# Patient Record
Sex: FEMALE | Race: WHITE | Employment: STUDENT | URBAN - METROPOLITAN AREA
[De-identification: names, ages, dates, MRNs, and addresses within clinical notes are randomized per-mention and may not be internally consistent; named-entity substitution may affect disease eponyms.]

---
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
## 2021-05-27 ENCOUNTER — APPOINTMENT (OUTPATIENT)
Dept: GENERAL RADIOLOGY | Age: 21
End: 2021-05-27
Attending: NURSE PRACTITIONER
Payer: COMMERCIAL

## 2021-05-27 ENCOUNTER — HOSPITAL ENCOUNTER (OUTPATIENT)
Age: 21
Discharge: HOME | End: 2021-05-27
Payer: COMMERCIAL

## 2021-05-27 ENCOUNTER — TELEPHONE (OUTPATIENT)
Dept: INTERNAL MEDICINE CLINIC | Facility: CLINIC | Age: 21
End: 2021-05-27

## 2021-05-27 VITALS
SYSTOLIC BLOOD PRESSURE: 132 MMHG | RESPIRATION RATE: 18 BRPM | TEMPERATURE: 98 F | OXYGEN SATURATION: 100 % | DIASTOLIC BLOOD PRESSURE: 79 MMHG | HEART RATE: 71 BPM

## 2021-05-27 DIAGNOSIS — Z00.00 EVALUATION BY MEDICAL SERVICE REQUIRED: Primary | ICD-10-CM

## 2021-05-27 DIAGNOSIS — Z20.822 LAB TEST NEGATIVE FOR COVID-19 VIRUS: ICD-10-CM

## 2021-05-27 PROCEDURE — 85378 FIBRIN DEGRADE SEMIQUANT: CPT | Performed by: NURSE PRACTITIONER

## 2021-05-27 PROCEDURE — 81002 URINALYSIS NONAUTO W/O SCOPE: CPT

## 2021-05-27 PROCEDURE — 99215 OFFICE O/P EST HI 40 MIN: CPT

## 2021-05-27 PROCEDURE — 71046 X-RAY EXAM CHEST 2 VIEWS: CPT | Performed by: NURSE PRACTITIONER

## 2021-05-27 PROCEDURE — 93010 ELECTROCARDIOGRAM REPORT: CPT

## 2021-05-27 PROCEDURE — 80047 BASIC METABLC PNL IONIZED CA: CPT

## 2021-05-27 PROCEDURE — 85025 COMPLETE CBC W/AUTO DIFF WBC: CPT | Performed by: NURSE PRACTITIONER

## 2021-05-27 PROCEDURE — 99213 OFFICE O/P EST LOW 20 MIN: CPT

## 2021-05-27 PROCEDURE — 36415 COLL VENOUS BLD VENIPUNCTURE: CPT

## 2021-05-27 PROCEDURE — 87086 URINE CULTURE/COLONY COUNT: CPT | Performed by: NURSE PRACTITIONER

## 2021-05-27 PROCEDURE — 93010 ELECTROCARDIOGRAM REPORT: CPT | Performed by: NURSE PRACTITIONER

## 2021-05-27 PROCEDURE — 81025 URINE PREGNANCY TEST: CPT

## 2021-05-27 PROCEDURE — 93005 ELECTROCARDIOGRAM TRACING: CPT

## 2021-05-27 RX ORDER — DEXTROAMPHETAMINE SACCHARATE, AMPHETAMINE ASPARTATE, DEXTROAMPHETAMINE SULFATE AND AMPHETAMINE SULFATE 2.5; 2.5; 2.5; 2.5 MG/1; MG/1; MG/1; MG/1
10 TABLET ORAL DAILY
COMMUNITY
End: 2021-09-13

## 2021-05-27 NOTE — ED PROVIDER NOTES
Patient Seen in: [facility]      History   No chief complaint on file.     Stated Complaint: not feeling well    HPI/Subjective:   HPI    23-year-old female with a history of tachycardia, panic attacks, headache here today with complaints of \ 100 %   O2 Device None (Room air)       Current:/79   Pulse 71   Temp 98 °F (36.7 °C) (Temporal)   Resp 18   SpO2 100%         Physical Exam    Adult physical exam:     VS: Vital signs reviewed.  O2 saturation within normal limits for this patient patient evaluation without personal hand/facial/oropharyngeal contact and gloves worn throughout encounter. See note and/or contact this provider for further PPE details.   Medical Record Review/reassessment: I reviewed available prior medical records for a

## 2021-05-27 NOTE — TELEPHONE ENCOUNTER
No PHI in chart to speak with Leida Ernst (mother) who is concerned about pts' fingers and toes getting cold intermittently, then gets warm and flushing of face. [place] at the mall today. Requesting appt today or tomorrow with new physician.    Note:

## 2021-05-27 NOTE — ED INITIAL ASSESSMENT (HPI)
Patient reports intermittent episodes of not feeling well over the last 8-9 months. Reports episodes of feeling extremely chilled and then flushed.  + hands cool to the touch, also with toes feeling numb at times.   + generalized weakness, states her limbs

## 2021-05-27 NOTE — TELEPHONE ENCOUNTER
Patient calling  ( see note below ) reports has not seen a doctor for over one year .  Has been noticing for a months toes \" turn yellow \" and go numb, fingers and lips \" turn purple \" for a few hours   Often feels chilled when none else is cold ; often

## 2021-05-27 NOTE — TELEPHONE ENCOUNTER
I contacted pt at home (PVM#[phone]), pt stated \"I just arrived home and want to drink water first, then will call back. Will wait for pt call back.      Please reply to pool: JO RN TRIAGE  Note pt has appt 5/28/21 @ 9:30am with Dr Zackary Engle

## 2021-05-28 ENCOUNTER — OFFICE VISIT (OUTPATIENT)
Dept: FAMILY MEDICINE CLINIC | Facility: CLINIC | Age: 21
End: 2021-05-28
Payer: COMMERCIAL

## 2021-05-28 ENCOUNTER — LAB ENCOUNTER (OUTPATIENT)
Dept: LAB | Age: 21
End: 2021-05-28
Attending: STUDENT IN AN ORGANIZED HEALTH CARE EDUCATION/TRAINING PROGRAM
Payer: COMMERCIAL

## 2021-05-28 VITALS
HEIGHT: 70 IN | WEIGHT: 137 LBS | TEMPERATURE: 98 F | SYSTOLIC BLOOD PRESSURE: 114 MMHG | DIASTOLIC BLOOD PRESSURE: 78 MMHG | HEART RATE: 101 BPM | BODY MASS INDEX: 19.61 KG/M2

## 2021-05-28 DIAGNOSIS — J30.2 SEASONAL ALLERGIES: ICD-10-CM

## 2021-05-28 DIAGNOSIS — R20.2 PARESTHESIA: ICD-10-CM

## 2021-05-28 DIAGNOSIS — R20.2 PARESTHESIA: Primary | ICD-10-CM

## 2021-05-28 PROBLEM — S90.222A SUBUNGUAL HEMATOMA OF FOOT, LEFT, INITIAL ENCOUNTER: Status: ACTIVE | Noted: 2019-01-22

## 2021-05-28 PROCEDURE — 99203 OFFICE O/P NEW LOW 30 MIN: CPT | Performed by: STUDENT IN AN ORGANIZED HEALTH CARE EDUCATION/TRAINING PROGRAM

## 2021-05-28 PROCEDURE — 86431 RHEUMATOID FACTOR QUANT: CPT

## 2021-05-28 PROCEDURE — 83540 ASSAY OF IRON: CPT

## 2021-05-28 PROCEDURE — 82746 ASSAY OF FOLIC ACID SERUM: CPT

## 2021-05-28 PROCEDURE — 82306 VITAMIN D 25 HYDROXY: CPT

## 2021-05-28 PROCEDURE — 86038 ANTINUCLEAR ANTIBODIES: CPT

## 2021-05-28 PROCEDURE — 36415 COLL VENOUS BLD VENIPUNCTURE: CPT

## 2021-05-28 PROCEDURE — 84466 ASSAY OF TRANSFERRIN: CPT

## 2021-05-28 PROCEDURE — 82607 VITAMIN B-12: CPT

## 2021-05-28 PROCEDURE — 84439 ASSAY OF FREE THYROXINE: CPT

## 2021-05-28 PROCEDURE — 84443 ASSAY THYROID STIM HORMONE: CPT

## 2021-05-28 PROCEDURE — 84425 ASSAY OF VITAMIN B-1: CPT

## 2021-05-28 PROCEDURE — 3074F SYST BP LT 130 MM HG: CPT | Performed by: STUDENT IN AN ORGANIZED HEALTH CARE EDUCATION/TRAINING PROGRAM

## 2021-05-28 PROCEDURE — 3078F DIAST BP <80 MM HG: CPT | Performed by: STUDENT IN AN ORGANIZED HEALTH CARE EDUCATION/TRAINING PROGRAM

## 2021-05-28 PROCEDURE — 3008F BODY MASS INDEX DOCD: CPT | Performed by: STUDENT IN AN ORGANIZED HEALTH CARE EDUCATION/TRAINING PROGRAM

## 2021-05-28 NOTE — PROGRESS NOTES
HPI:    Patient ID: Mel Roberson is a 22year old female. HPI  Pt presenting with multiple concerns. She reports irregular daily life for the last 8 months due to multiple physical symptoms.  She reports issues with body temperature dysregulation, ear normal.   Eyes:      Extraocular Movements: Extraocular movements intact. Conjunctiva/sclera: Conjunctivae normal.      Pupils: Pupils are equal, round, and reactive to light. Neck:      Thyroid: No thyroid mass or thyroid tenderness.    [employer] - demonstrated how to administer Flonase medication  - avoid triggers as able  - increase fluid hydration and rest as tolerated  - to call with any questions or concerns    Pt verbalized understanding and agrees with plan.     Spent 35 minutes obtaining HP

## 2021-08-11 ENCOUNTER — OFFICE VISIT (OUTPATIENT)
Dept: FAMILY MEDICINE CLINIC | Facility: CLINIC | Age: 21
End: 2021-08-11
Payer: COMMERCIAL

## 2021-08-11 VITALS
SYSTOLIC BLOOD PRESSURE: 116 MMHG | HEART RATE: 50 BPM | WEIGHT: 135 LBS | DIASTOLIC BLOOD PRESSURE: 51 MMHG | HEIGHT: 70 IN | TEMPERATURE: 98 F | BODY MASS INDEX: 19.33 KG/M2

## 2021-08-11 DIAGNOSIS — M54.2 PAIN OF NECK WITH RECENT TRAUMATIC INJURY: ICD-10-CM

## 2021-08-11 DIAGNOSIS — R42 DIZZINESS AFTER EXTENSION OF NECK: ICD-10-CM
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
## 2021-08-11 PROCEDURE — 3008F BODY MASS INDEX DOCD: CPT | Performed by: STUDENT IN AN ORGANIZED HEALTH CARE EDUCATION/TRAINING PROGRAM

## 2021-08-11 PROCEDURE — 3078F DIAST BP <80 MM HG: CPT | Performed by: STUDENT IN AN ORGANIZED HEALTH CARE EDUCATION/TRAINING PROGRAM

## 2021-08-11 PROCEDURE — 99214 OFFICE O/P EST MOD 30 MIN: CPT | Performed by: STUDENT IN AN ORGANIZED HEALTH CARE EDUCATION/TRAINING PROGRAM

## 2021-08-11 PROCEDURE — 3074F SYST BP LT 130 MM HG: CPT | Performed by: STUDENT IN AN ORGANIZED HEALTH CARE EDUCATION/TRAINING PROGRAM

## 2021-08-11 RX ORDER — NORETHINDRONE ACETATE AND ETHINYL ESTRADIOL, ETHINYL ESTRADIOL AND FERROUS FUMARATE 1MG-10(24)
1 KIT ORAL DAILY
COMMUNITY
Start: 2021-06-18 | End: 2021-09-14

## 2021-08-11 RX ORDER — PREDNISONE 20 MG/1
40 TABLET ORAL DAILY
Qty: 10 TABLET | Refills: 0 | Status: SHIPPED | OUTPATIENT
Start: 2021-08-11 | End: 2021-08-11

## 2021-08-11 RX ORDER — PREDNISONE 20 MG/1
40 TABLET ORAL DAILY
Qty: 10 TABLET | Refills: 0 | Status: SHIPPED | OUTPATIENT
Start: 2021-08-11 | End: 2021-08-16

## 2021-08-13 ENCOUNTER — TELEPHONE (OUTPATIENT)
Dept: FAMILY MEDICINE CLINIC | Facility: CLINIC | Age: 21
End: 2021-08-13
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
## 2021-09-10 ENCOUNTER — TELEPHONE (OUTPATIENT)
Dept: FAMILY MEDICINE CLINIC | Facility: CLINIC | Age: 21
End: 2021-09-10

## 2021-09-10 ENCOUNTER — MED REC SCAN ONLY (OUTPATIENT)
Dept: FAMILY MEDICINE CLINIC | Facility: CLINIC | Age: 21
End: 2021-09-10

## 2021-09-10 NOTE — TELEPHONE ENCOUNTER
Received fax from [facility]. Forms signed by dr Harpal Isbell. Successfully faxed back to [phone]. Confirmation # and forms placed in scanning.

## 2021-09-13 NOTE — TELEPHONE ENCOUNTER
According to office visit notes from her pediatrician Dr. Balbina Ventura in 07/11/19, pt has ADHD( originally diagnosed in 11/28/2012).

## 2021-09-13 NOTE — TELEPHONE ENCOUNTER
Pt would like to know if Dr. Carole Dumas can refill her medication of adderall 20 milligrams once a day. Per the patient the psychiatrist that she sees and prescribes the medication to her in retiring. Pt states that she lives out of [facility] for School.  Please

## 2021-09-13 NOTE — TELEPHONE ENCOUNTER
Dr. Wilmer Sampson-- historical medication, routing to you.  Medication has been pended for your review/approval.

## 2021-09-14 RX ORDER — NORETHINDRONE ACETATE AND ETHINYL ESTRADIOL, ETHINYL ESTRADIOL AND FERROUS FUMARATE 1MG-10(24)
KIT ORAL
Qty: 84 TABLET | Refills: 1 | Status: SHIPPED | OUTPATIENT
Start: 2021-09-14

## 2021-09-14 NOTE — TELEPHONE ENCOUNTER
Spoke w/ pt. She is away at college & will be back in Dec for the holidays. Pt will schedule appt then. rx sent.  Pt verbalized an understanding & agrees w/ plan

## 2021-10-15 DIAGNOSIS — F90.9 ATTENTION DEFICIT HYPERACTIVITY DISORDER (ADHD), UNSPECIFIED ADHD TYPE: ICD-10-CM

## 2021-10-15 RX ORDER — DEXTROAMPHETAMINE SACCHARATE, AMPHETAMINE ASPARTATE, DEXTROAMPHETAMINE SULFATE AND AMPHETAMINE SULFATE 5; 5; 5; 5 MG/1; MG/1; MG/1; MG/1
20 TABLET ORAL DAILY
Qty: 30 TABLET | Refills: 0 | Status: SHIPPED | OUTPATIENT
Start: 2021-10-15

## 2021-10-15 NOTE — TELEPHONE ENCOUNTER
Pt would like a refill on RX adderall 20MG for 30 tablets. Pt stts she has 1 pill left.  Please advise       Current Outpatient Medications   Medication Sig Dispense Refill          • amphetamine-dextroamphetamine 20 MG Oral Tab Take 1 tablet (20 mg total)

## 2021-11-15 DIAGNOSIS — F90.9 ATTENTION DEFICIT HYPERACTIVITY DISORDER (ADHD), UNSPECIFIED ADHD TYPE: ICD-10-CM

## 2021-11-16 NOTE — TELEPHONE ENCOUNTER
Patient is out of medication. Dr. Syd Sharma is out of the office today and patient is out of medication. Please advise.

## 2021-11-17 ENCOUNTER — TELEPHONE (OUTPATIENT)
Dept: FAMILY MEDICINE CLINIC | Facility: CLINIC | Age: 21
End: 2021-11-17

## 2021-11-17 RX ORDER — DEXTROAMPHETAMINE SACCHARATE, AMPHETAMINE ASPARTATE, DEXTROAMPHETAMINE SULFATE AND AMPHETAMINE SULFATE 5; 5; 5; 5 MG/1; MG/1; MG/1; MG/1
20 TABLET ORAL DAILY
Qty: 30 TABLET | Refills: 0 | Status: SHIPPED | OUTPATIENT
Start: 2022-01-16 | End: 2022-02-15

## 2021-11-17 RX ORDER — DEXTROAMPHETAMINE SACCHARATE, AMPHETAMINE ASPARTATE, DEXTROAMPHETAMINE SULFATE AND AMPHETAMINE SULFATE 5; 5; 5; 5 MG/1; MG/1; MG/1; MG/1
20 TABLET ORAL DAILY
Qty: 30 TABLET | Refills: 0 | Status: SHIPPED | OUTPATIENT
Start: 2021-11-17 | End: 2021-12-17

## 2021-11-17 RX ORDER — DEXTROAMPHETAMINE SACCHARATE, AMPHETAMINE ASPARTATE, DEXTROAMPHETAMINE SULFATE AND AMPHETAMINE SULFATE 5; 5; 5; 5 MG/1; MG/1; MG/1; MG/1
20 TABLET ORAL DAILY
Qty: 30 TABLET | Refills: 0 | Status: SHIPPED | OUTPATIENT
Start: 2021-12-16 | End: 2022-01-14

## 2021-11-17 NOTE — TELEPHONE ENCOUNTER
Patients mother Beau Arredondo calling for patient regarding script for rx Adderall  Please call patient directly at [phone],[employer] Scheduling.   *Requesting for script to be filled right away, patient is in [facility].

## 2021-11-17 NOTE — TELEPHONE ENCOUNTER
Verified name and . Patient was notified that prescription was sent to pharmacy. Disp Refills Start End    amphetamine-dextroamphetamine (ADDERALL) 20 MG Oral Tab 30 tablet 0 2021    Sig - Route:  Take 1 tablet (20 mg total) by mo

## 2022-01-17 ENCOUNTER — MED REC SCAN ONLY (OUTPATIENT)
Dept: FAMILY MEDICINE CLINIC | Facility: CLINIC | Age: 22
End: 2022-01-17

## 2022-01-17 ENCOUNTER — TELEPHONE (OUTPATIENT)
Dept: FAMILY MEDICINE CLINIC | Facility: CLINIC | Age: 22
End: 2022-01-17

## 2022-01-17 NOTE — TELEPHONE ENCOUNTER
Received fax fro, [facility]. Form signed by dr Kristal Darnell. Successfully faxed back to [phone]. Confirmation # and forms placed in scanning.

## 2022-02-15 ENCOUNTER — TELEPHONE (OUTPATIENT)
Dept: FAMILY MEDICINE CLINIC | Facility: CLINIC | Age: 22
End: 2022-02-15

## 2022-02-15 RX ORDER — DEXTROAMPHETAMINE SACCHARATE, AMPHETAMINE ASPARTATE, DEXTROAMPHETAMINE SULFATE AND AMPHETAMINE SULFATE 5; 5; 5; 5 MG/1; MG/1; MG/1; MG/1
20 TABLET ORAL DAILY
Qty: 30 TABLET | Refills: 0 | Status: CANCELLED | OUTPATIENT
Start: 2022-04-17 | End: 2022-05-17

## 2022-02-15 RX ORDER — DEXTROAMPHETAMINE SACCHARATE, AMPHETAMINE ASPARTATE, DEXTROAMPHETAMINE SULFATE AND AMPHETAMINE SULFATE 5; 5; 5; 5 MG/1; MG/1; MG/1; MG/1
20 TABLET ORAL DAILY
Qty: 30 TABLET | Refills: 0 | Status: CANCELLED | OUTPATIENT
Start: 2022-03-17 | End: 2022-04-16

## 2022-02-15 RX ORDER — DEXTROAMPHETAMINE SACCHARATE, AMPHETAMINE ASPARTATE, DEXTROAMPHETAMINE SULFATE AND AMPHETAMINE SULFATE 5; 5; 5; 5 MG/1; MG/1; MG/1; MG/1
20 TABLET ORAL DAILY
Qty: 30 TABLET | Refills: 0 | Status: CANCELLED | OUTPATIENT
Start: 2022-03-18 | End: 2022-04-17

## 2022-02-15 RX ORDER — DEXTROAMPHETAMINE SACCHARATE, AMPHETAMINE ASPARTATE, DEXTROAMPHETAMINE SULFATE AND AMPHETAMINE SULFATE 5; 5; 5; 5 MG/1; MG/1; MG/1; MG/1
20 TABLET ORAL DAILY
Qty: 30 TABLET | Refills: 0 | Status: SHIPPED | OUTPATIENT
Start: 2022-02-15 | End: 2022-03-17

## 2022-02-15 RX ORDER — DEXTROAMPHETAMINE SACCHARATE, AMPHETAMINE ASPARTATE, DEXTROAMPHETAMINE SULFATE AND AMPHETAMINE SULFATE 5; 5; 5; 5 MG/1; MG/1; MG/1; MG/1
20 TABLET ORAL DAILY
Qty: 30 TABLET | Refills: 0 | Status: CANCELLED | OUTPATIENT
Start: 2022-02-15 | End: 2022-03-17

## 2022-02-15 RX ORDER — DEXTROAMPHETAMINE SACCHARATE, AMPHETAMINE ASPARTATE, DEXTROAMPHETAMINE SULFATE AND AMPHETAMINE SULFATE 5; 5; 5; 5 MG/1; MG/1; MG/1; MG/1
20 TABLET ORAL DAILY
Qty: 30 TABLET | Refills: 0 | Status: CANCELLED | OUTPATIENT
Start: 2022-04-18 | End: 2022-05-18

## 2022-02-15 NOTE — TELEPHONE ENCOUNTER
Patient calling to check on the status of refill for Adderall. She states that she needs it by end of day.  Please sent to   [facility], [place]

## 2022-02-15 NOTE — TELEPHONE ENCOUNTER
Dr. Scarlett Newman did fill Adderall 2/15/2022 for patient, as Dr. Derek Adams out of office today--further refills as per Dr. Derek Adams.     March and April Adderall pended per patient request    Please send, if appropriate

## 2022-02-15 NOTE — TELEPHONE ENCOUNTER
Sent to Dr. Bret Mckinney for Dr. Wily Redman (out of office)--patient tearful--requesting refill today    Medications pended for approval    Pharmacy verified

## 2022-02-15 NOTE — TELEPHONE ENCOUNTER
Spoke with patient and relayed Dr. Xavi Pack message below--patient verbalizes understanding and agreement and is thankful, as she has a dance performance tomorrow. No further questions/concerns at this time.

## 2022-02-15 NOTE — TELEPHONE ENCOUNTER
Message noted: Chart reviewed and may refill medication as requested times one. Prescription sent to listed pharmacy.  Further refills as per Dr Jeff Polo

## 2022-02-15 NOTE — TELEPHONE ENCOUNTER
Pt is calling for status of her medication refill request. Pt states that she is out of medication. Pt would like confirm that it will be sent to [facility] in [place]. Pt can be reached at [phone].

## 2022-02-15 NOTE — TELEPHONE ENCOUNTER
Patient mother requesting refill on following prescription. Patient is out of medication. Patient thought it was going to be filled for a 90 day supply.  Please sent to [facility] in [place].       amphetamine-dextroamphetamine (ADDERALL) 20 MG Oral Tab

## 2022-02-16 RX ORDER — DEXTROAMPHETAMINE SACCHARATE, AMPHETAMINE ASPARTATE, DEXTROAMPHETAMINE SULFATE AND AMPHETAMINE SULFATE 5; 5; 5; 5 MG/1; MG/1; MG/1; MG/1
TABLET ORAL
Qty: 30 TABLET | Refills: 0 | Status: SHIPPED | OUTPATIENT
Start: 2022-03-15

## 2022-02-16 RX ORDER — DEXTROAMPHETAMINE SACCHARATE, AMPHETAMINE ASPARTATE, DEXTROAMPHETAMINE SULFATE AND AMPHETAMINE SULFATE 5; 5; 5; 5 MG/1; MG/1; MG/1; MG/1
20 TABLET ORAL DAILY
Qty: 30 TABLET | Refills: 0 | Status: SHIPPED | OUTPATIENT
Start: 2022-04-15 | End: 2022-05-15

## 2022-03-25 RX ORDER — NORETHINDRONE ACETATE AND ETHINYL ESTRADIOL, ETHINYL ESTRADIOL AND FERROUS FUMARATE 1MG-10(24)
1 KIT ORAL DAILY
Qty: 84 TABLET | Refills: 0 | Status: SHIPPED | OUTPATIENT
Start: 2022-03-25 | End: 2022-06-20

## 2022-04-06 ENCOUNTER — MED REC SCAN ONLY (OUTPATIENT)
Dept: FAMILY MEDICINE CLINIC | Facility: CLINIC | Age: 22
End: 2022-04-06

## 2022-04-12 NOTE — TELEPHONE ENCOUNTER
Patient is requesting a refill, send to [facility]- #100 on file/updated.     amphetamine-dextroamphetamine (ADDERALL) 20 MG Oral Tab
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
## 2022-04-13 RX ORDER — DEXTROAMPHETAMINE SACCHARATE, AMPHETAMINE ASPARTATE, DEXTROAMPHETAMINE SULFATE AND AMPHETAMINE SULFATE 5; 5; 5; 5 MG/1; MG/1; MG/1; MG/1
1 TABLET ORAL DAILY
Qty: 30 TABLET | Refills: 0 | Status: SHIPPED | OUTPATIENT
Start: 2022-04-13 | End: 2022-04-15

## 2022-04-15 RX ORDER — DEXTROAMPHETAMINE SACCHARATE, AMPHETAMINE ASPARTATE, DEXTROAMPHETAMINE SULFATE AND AMPHETAMINE SULFATE 5; 5; 5; 5 MG/1; MG/1; MG/1; MG/1
1 TABLET ORAL DAILY
Qty: 30 TABLET | Refills: 0 | Status: SHIPPED | OUTPATIENT
Start: 2022-04-15

## 2022-04-15 NOTE — TELEPHONE ENCOUNTER
Patient is calling to advise prescription was sent to wrong pharmacy in [facility]. Please note new and only pharmacy on.patient's file. Patient notes she does not have any remaining dosage left now. Patient is asking to please cancel the order at [facility] in [place] and send to new pharmacy noted on file. Patient is requesting a call back. Please advise.       Amphetamine

## 2022-05-23 ENCOUNTER — MED REC SCAN ONLY (OUTPATIENT)
Dept: FAMILY MEDICINE CLINIC | Facility: CLINIC | Age: 22
End: 2022-05-23

## 2022-06-09 RX ORDER — DEXTROAMPHETAMINE SACCHARATE, AMPHETAMINE ASPARTATE, DEXTROAMPHETAMINE SULFATE AND AMPHETAMINE SULFATE 5; 5; 5; 5 MG/1; MG/1; MG/1; MG/1
20 TABLET ORAL DAILY
Qty: 30 TABLET | Refills: 0 | Status: SHIPPED | OUTPATIENT
Start: 2022-08-10 | End: 2022-06-13

## 2022-06-09 RX ORDER — DEXTROAMPHETAMINE SACCHARATE, AMPHETAMINE ASPARTATE, DEXTROAMPHETAMINE SULFATE AND AMPHETAMINE SULFATE 5; 5; 5; 5 MG/1; MG/1; MG/1; MG/1
20 TABLET ORAL DAILY
Qty: 30 TABLET | Refills: 0 | Status: SHIPPED | OUTPATIENT
Start: 2022-06-09 | End: 2022-06-11

## 2022-06-09 RX ORDER — DEXTROAMPHETAMINE SACCHARATE, AMPHETAMINE ASPARTATE, DEXTROAMPHETAMINE SULFATE AND AMPHETAMINE SULFATE 5; 5; 5; 5 MG/1; MG/1; MG/1; MG/1
20 TABLET ORAL DAILY
Qty: 30 TABLET | Refills: 0 | Status: SHIPPED | OUTPATIENT
Start: 2022-07-10 | End: 2022-06-13
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
## 2022-06-11 ENCOUNTER — TELEPHONE (OUTPATIENT)
Dept: FAMILY MEDICINE CLINIC | Facility: CLINIC | Age: 22
End: 2022-06-11

## 2022-06-13 ENCOUNTER — TELEPHONE (OUTPATIENT)
Dept: FAMILY MEDICINE CLINIC | Facility: CLINIC | Age: 22
End: 2022-06-13

## 2022-06-13 RX ORDER — DEXTROAMPHETAMINE SACCHARATE, AMPHETAMINE ASPARTATE, DEXTROAMPHETAMINE SULFATE AND AMPHETAMINE SULFATE 5; 5; 5; 5 MG/1; MG/1; MG/1; MG/1
20 TABLET ORAL DAILY
Qty: 30 TABLET | Refills: 0 | Status: SHIPPED | OUTPATIENT
Start: 2022-08-13 | End: 2022-06-13

## 2022-06-13 RX ORDER — DEXTROAMPHETAMINE SACCHARATE, AMPHETAMINE ASPARTATE, DEXTROAMPHETAMINE SULFATE AND AMPHETAMINE SULFATE 5; 5; 5; 5 MG/1; MG/1; MG/1; MG/1
20 TABLET ORAL DAILY
Qty: 30 TABLET | Refills: 0 | Status: SHIPPED | OUTPATIENT
Start: 2022-06-13 | End: 2022-06-13

## 2022-06-13 RX ORDER — DEXTROAMPHETAMINE SACCHARATE, AMPHETAMINE ASPARTATE, DEXTROAMPHETAMINE SULFATE AND AMPHETAMINE SULFATE 5; 5; 5; 5 MG/1; MG/1; MG/1; MG/1
20 TABLET ORAL DAILY
Qty: 30 TABLET | Refills: 0 | Status: SHIPPED | OUTPATIENT
Start: 2022-07-10 | End: 2022-06-13

## 2022-06-13 NOTE — TELEPHONE ENCOUNTER
Shweta Brown from [facility] requesting additional information for a script that was sent to make it valid in [facility] is requesting:  ICD 10 Code, days script is expected to last, and date of last office visit.

## 2022-06-13 NOTE — TELEPHONE ENCOUNTER
Mother called in to follow up on request, notified that we just received information from provider and I would call. Called [facility] and spoke with Zuleyka Mcneil, information provided and she stated that patient can not fill as her last visit is outside of 6 months. Patient notified, verbalized understanding. Patient is asking if medication can be filled in [place] were she is going tomorrow since it can not be filled in [place]? Pended for review.

## 2022-06-13 NOTE — TELEPHONE ENCOUNTER
Patient called stating she really needs the Adderall. Per message from pharmacy below they need a ICD 10 code, last office visit, and start and end date for rx because according to pharmacy the script is invalid. Please advise.

## 2022-06-13 NOTE — TELEPHONE ENCOUNTER
Please review refill protocol failed/ no protocol  Requested Prescriptions   Pending Prescriptions Disp Refills    amphetamine-dextroamphetamine (ADDERALL) 20 MG Oral Tab 30 tablet 0     Sig: Take 1 tablet (20 mg total) by mouth daily.         There is no refill protocol information for this order

## 2022-06-14 RX ORDER — DEXTROAMPHETAMINE SACCHARATE, AMPHETAMINE ASPARTATE, DEXTROAMPHETAMINE SULFATE AND AMPHETAMINE SULFATE 5; 5; 5; 5 MG/1; MG/1; MG/1; MG/1
20 TABLET ORAL DAILY
Qty: 30 TABLET | Refills: 0 | Status: CANCELLED | OUTPATIENT
Start: 2022-08-14 | End: 2022-09-13

## 2022-06-14 RX ORDER — DEXTROAMPHETAMINE SACCHARATE, AMPHETAMINE ASPARTATE, DEXTROAMPHETAMINE SULFATE AND AMPHETAMINE SULFATE 5; 5; 5; 5 MG/1; MG/1; MG/1; MG/1
20 TABLET ORAL DAILY
Qty: 30 TABLET | Refills: 0 | Status: CANCELLED | OUTPATIENT
Start: 2022-07-14 | End: 2022-08-13

## 2022-06-14 RX ORDER — DEXTROAMPHETAMINE SACCHARATE, AMPHETAMINE ASPARTATE, DEXTROAMPHETAMINE SULFATE AND AMPHETAMINE SULFATE 5; 5; 5; 5 MG/1; MG/1; MG/1; MG/1
20 TABLET ORAL DAILY
Qty: 30 TABLET | Refills: 0 | Status: SHIPPED | OUTPATIENT
Start: 2022-06-14 | End: 2022-07-14

## 2022-06-14 NOTE — TELEPHONE ENCOUNTER
Message noted: Chart reviewed and may refill medication times one as requested. Prescription sent to listed pharmacy. Please notify patient.  Further refills as per Dr. Sandra Tomlinson

## 2022-06-14 NOTE — TELEPHONE ENCOUNTER
for Dr. Morales Score Pt called again today to know the status of her message from yesterday pt stated that she is flying home to [facility], [facility]. and she needs her medication to be sent to [facility] in [address] [place] [address]. Pt stated that she has not had it for 4 days and it starting to not feel well.  Please Advise

## 2022-06-15 RX ORDER — NORETHINDRONE ACETATE AND ETHINYL ESTRADIOL, ETHINYL ESTRADIOL AND FERROUS FUMARATE 1MG-10(24)
1 KIT ORAL DAILY
Qty: 84 TABLET | Refills: 0 | OUTPATIENT
Start: 2022-06-15

## 2022-06-20 RX ORDER — NORETHINDRONE ACETATE AND ETHINYL ESTRADIOL 1MG-20(21)
1 KIT ORAL DAILY
Qty: 84 TABLET | Refills: 3 | Status: SHIPPED | OUTPATIENT
Start: 2022-06-20 | End: 2022-06-21

## 2022-06-20 RX ORDER — NORETHINDRONE ACETATE AND ETHINYL ESTRADIOL, ETHINYL ESTRADIOL AND FERROUS FUMARATE 1MG-10(24)
1 KIT ORAL DAILY
Qty: 84 TABLET | Refills: 0 | Status: CANCELLED | OUTPATIENT
Start: 2022-06-20

## 2022-06-20 RX ORDER — NORETHINDRONE ACETATE AND ETHINYL ESTRADIOL, ETHINYL ESTRADIOL AND FERROUS FUMARATE 1MG-10(24)
1 KIT ORAL DAILY
Qty: 84 TABLET | Refills: 0 | OUTPATIENT
Start: 2022-06-20

## 2022-06-21 NOTE — TELEPHONE ENCOUNTER
Patient called informed her of message below    Transfer called to staff as patient was not happy medication was changed.

## 2022-06-21 NOTE — TELEPHONE ENCOUNTER
Eduardo Daily, MICK [place], RN 5 hours ago (1:46 PM)     RX adjusted due to insurance coverage to loestrin 1/20. Refill x 1 year. PLease call to inform.  Recommend annual exam      Unable to LM, phone rings and then goes to busy signal.

## 2022-06-21 NOTE — TELEPHONE ENCOUNTER
Rosalie Tinoco from [facility] calling in regards to [facility], states a different one was called in.  Please advise

## 2022-06-21 NOTE — TELEPHONE ENCOUNTER
Patient requesting to speak with nurse regarding birth control that was prescribed. Please call at [phone],JACEYWNEIL.   *Patient will unavailable from 1:45-4:00 [place]

## 2022-06-21 NOTE — TELEPHONE ENCOUNTER
advised pt she will need to schedule an annual exam as she has not been seen in over 2 years. Pt states wrong rx was sent to her pharmacy yesterday because her insurance does not covver the Lo Loestrin. Instructed pt to check w/ pharmacy to see if the out of pocket cost is sustainable, otherwise she will need to switch to a different type. Discussed potential side effects w/ any birth control & advised it may take up to 3 months to adjust. Pt states she will schedule appt when she is in town. Suggested pt find provider where she lives so that she will not have scheduling issues.  Pt verbalized an understanding & agrees w/ plan

## 2022-06-21 NOTE — TELEPHONE ENCOUNTER
Spoke w/ pharmacist. States rx was different. Advised pt stated it was no longer covered by her insurance. Pharmacist ran rx & Judye Burkitt is covered w/o a copay. Rx changed.  [employer] message sent to pt

## 2022-06-29 ENCOUNTER — OFFICE VISIT (OUTPATIENT)
Dept: OBGYN CLINIC | Facility: CLINIC | Age: 22
End: 2022-06-29
Payer: COMMERCIAL

## 2022-06-29 VITALS
WEIGHT: 139 LBS | HEIGHT: 70 IN | HEART RATE: 66 BPM | SYSTOLIC BLOOD PRESSURE: 121 MMHG | BODY MASS INDEX: 19.9 KG/M2 | DIASTOLIC BLOOD PRESSURE: 75 MMHG

## 2022-06-29 DIAGNOSIS — Z11.3 SCREEN FOR STD (SEXUALLY TRANSMITTED DISEASE): ICD-10-CM

## 2022-06-29 DIAGNOSIS — Z12.4 PAP SMEAR FOR CERVICAL CANCER SCREENING: Primary | ICD-10-CM

## 2022-06-29 PROCEDURE — 3074F SYST BP LT 130 MM HG: CPT | Performed by: ADVANCED PRACTICE MIDWIFE

## 2022-06-29 PROCEDURE — 3078F DIAST BP <80 MM HG: CPT | Performed by: ADVANCED PRACTICE MIDWIFE

## 2022-06-29 PROCEDURE — 3008F BODY MASS INDEX DOCD: CPT | Performed by: ADVANCED PRACTICE MIDWIFE

## 2022-06-29 PROCEDURE — 99395 PREV VISIT EST AGE 18-39: CPT | Performed by: ADVANCED PRACTICE MIDWIFE

## 2022-06-30 LAB
C TRACH DNA SPEC QL NAA+PROBE: NEGATIVE
N GONORRHOEA DNA SPEC QL NAA+PROBE: NEGATIVE

## 2022-07-11 RX ORDER — DEXTROAMPHETAMINE SACCHARATE, AMPHETAMINE ASPARTATE, DEXTROAMPHETAMINE SULFATE AND AMPHETAMINE SULFATE 5; 5; 5; 5 MG/1; MG/1; MG/1; MG/1
TABLET ORAL
Qty: 30 TABLET | Refills: 0 | Status: SHIPPED | OUTPATIENT
Start: 2022-07-11

## 2022-07-11 NOTE — TELEPHONE ENCOUNTER
Pt on the phone stating she is out of the following medication   amphetamine-dextroamphetamine (ADDERALL) 20 MG Oral Tab

## 2022-08-13 DIAGNOSIS — F90.9 ATTENTION DEFICIT HYPERACTIVITY DISORDER (ADHD), UNSPECIFIED ADHD TYPE: ICD-10-CM

## 2022-08-13 RX ORDER — DEXTROAMPHETAMINE SACCHARATE, AMPHETAMINE ASPARTATE, DEXTROAMPHETAMINE SULFATE AND AMPHETAMINE SULFATE 5; 5; 5; 5 MG/1; MG/1; MG/1; MG/1
TABLET ORAL
Qty: 30 TABLET | Refills: 0 | Status: SHIPPED | OUTPATIENT
Start: 2022-08-13

## 2022-08-13 RX ORDER — DEXTROAMPHETAMINE SACCHARATE, AMPHETAMINE ASPARTATE, DEXTROAMPHETAMINE SULFATE AND AMPHETAMINE SULFATE 5; 5; 5; 5 MG/1; MG/1; MG/1; MG/1
20 TABLET ORAL DAILY
Qty: 30 TABLET | Refills: 0 | OUTPATIENT
Start: 2022-08-13

## 2022-08-13 RX ORDER — DEXTROAMPHETAMINE SACCHARATE, AMPHETAMINE ASPARTATE, DEXTROAMPHETAMINE SULFATE AND AMPHETAMINE SULFATE 5; 5; 5; 5 MG/1; MG/1; MG/1; MG/1
1 TABLET ORAL DAILY
Qty: 30 TABLET | Refills: 0 | OUTPATIENT
Start: 2022-08-13

## 2022-09-10 NOTE — TELEPHONE ENCOUNTER
Patient requesting refill for the following, would like a call once refill has been placed:      Adderrald 20 MG
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
## 2022-09-12 RX ORDER — DEXTROAMPHETAMINE SACCHARATE, AMPHETAMINE ASPARTATE, DEXTROAMPHETAMINE SULFATE AND AMPHETAMINE SULFATE 5; 5; 5; 5 MG/1; MG/1; MG/1; MG/1
1 TABLET ORAL DAILY
Qty: 30 TABLET | Refills: 0 | OUTPATIENT
Start: 2022-09-12

## 2022-09-12 RX ORDER — DEXTROAMPHETAMINE SACCHARATE, AMPHETAMINE ASPARTATE, DEXTROAMPHETAMINE SULFATE AND AMPHETAMINE SULFATE 5; 5; 5; 5 MG/1; MG/1; MG/1; MG/1
20 TABLET ORAL DAILY
Qty: 30 TABLET | Refills: 0 | Status: SHIPPED | OUTPATIENT
Start: 2022-09-12 | End: 2022-10-12

## 2022-09-12 RX ORDER — DEXTROAMPHETAMINE SACCHARATE, AMPHETAMINE ASPARTATE, DEXTROAMPHETAMINE SULFATE AND AMPHETAMINE SULFATE 5; 5; 5; 5 MG/1; MG/1; MG/1; MG/1
20 TABLET ORAL DAILY
Qty: 30 TABLET | Refills: 0 | Status: SHIPPED | OUTPATIENT
Start: 2022-11-11 | End: 2022-12-11

## 2022-09-12 RX ORDER — DEXTROAMPHETAMINE SACCHARATE, AMPHETAMINE ASPARTATE, DEXTROAMPHETAMINE SULFATE AND AMPHETAMINE SULFATE 5; 5; 5; 5 MG/1; MG/1; MG/1; MG/1
20 TABLET ORAL DAILY
Qty: 30 TABLET | Refills: 0 | Status: SHIPPED | OUTPATIENT
Start: 2022-10-11 | End: 2022-11-10

## 2022-10-17 ENCOUNTER — TELEPHONE (OUTPATIENT)
Dept: OBGYN CLINIC | Facility: CLINIC | Age: 22
End: 2022-10-17

## 2022-10-17 RX ORDER — NORETHINDRONE ACETATE AND ETHINYL ESTRADIOL, ETHINYL ESTRADIOL AND FERROUS FUMARATE 1MG-10(24)
1 KIT ORAL DAILY
Qty: 28 TABLET | Refills: 10 | Status: CANCELLED | OUTPATIENT
Start: 2022-10-17 | End: 2022-11-14

## 2022-10-17 NOTE — TELEPHONE ENCOUNTER
Mom calling, pt insurance goes [facility], pt has been out of b/c for 1-week. Pt b/c is no longer covered. Pharmacy needs to be called to get the prescription authorized for refills. [phone] [facility]. Lo-loestrin refills are trying to be ordered.

## 2022-10-17 NOTE — TELEPHONE ENCOUNTER
[facility] is no longer covered by insurance, looking for another alternative.  please advise

## 2022-10-17 NOTE — TELEPHONE ENCOUNTER
[facility] calling for update, please call at [phone],[facility]   *currently out of birth control

## 2022-10-17 NOTE — TELEPHONE ENCOUNTER
Per pharmacy the following are alternatives that would be covered by insurance:     1.  Kimberly Iglesias  2. Junel Fe 24       pls advise

## 2022-10-18 RX ORDER — NORETHINDRONE ACETATE AND ETHINYL ESTRADIOL 1MG-20(21)
1 KIT ORAL DAILY
Qty: 28 TABLET | Refills: 7 | Status: SHIPPED | OUTPATIENT
Start: 2022-10-18 | End: 2023-10-18

## 2022-10-18 NOTE — TELEPHONE ENCOUNTER
Pt out of birth control x10 days. Advised we sent messages to [facility] but she is out of the office. Pt states cost of rx w/o insurance is $150 w/ coupon. Cannot afford. Pharmacy confirmed. Advised pt I will speak w. Another cnm buy cannot guarantee they will send an rx. Ok to lm per pt as she will be in class.  Pt verbalized an understanding & agrees w/ plan    Spoke w/ CHRIST & she will review chart

## 2022-10-18 NOTE — TELEPHONE ENCOUNTER
rx for Junel sent by [facility] for pt. Lm for pt per pt req advising rx was sent, there is no generic for lo loestrin & this is the closest to it. Pt may experience side effects for first few months until her body adjusts, including irreg spotting. Pt also instructed to use a condom for first 2 weeks of using this ocp as she was off ocp for some time.

## 2022-10-18 NOTE — TELEPHONE ENCOUNTER
Patient calling to follow up on this. She is concerned that she has been out for a week and would like to figure this out as soon as possible. Please advise.

## 2022-12-06 ENCOUNTER — TELEPHONE (OUTPATIENT)
Dept: OBGYN CLINIC | Facility: CLINIC | Age: 22
End: 2022-12-06

## 2022-12-09 RX ORDER — DEXTROAMPHETAMINE SACCHARATE, AMPHETAMINE ASPARTATE, DEXTROAMPHETAMINE SULFATE AND AMPHETAMINE SULFATE 5; 5; 5; 5 MG/1; MG/1; MG/1; MG/1
20 TABLET ORAL DAILY
Qty: 30 TABLET | Refills: 0 | Status: SHIPPED | OUTPATIENT
Start: 2022-12-09 | End: 2023-01-08

## 2022-12-09 NOTE — TELEPHONE ENCOUNTER
Left message to call back. Last office visit 8/11/2021. Last filled 11/11/2022 authorized by Dr Twyla Bledsoe. Dr Marcia Segovia have you been Fabye Marie this script?

## 2022-12-09 NOTE — TELEPHONE ENCOUNTER
Patient is requesting refill for amphetamine-dextroamphetamine (ADDERALL) 20 MG Oral Tab    [address] [facility].  [place]

## 2022-12-09 NOTE — TELEPHONE ENCOUNTER
Spoke to patient. She verbalized understanding that she needs to schedule an appointment but is going back to school and won't be home until the end of the month for her winter break. She said that she has a few performances coming up and she really needs the medication to, \"do her job. \"     Dr. Aron Live, please advise on refill.

## 2023-01-09 ENCOUNTER — TELEPHONE (OUTPATIENT)
Dept: FAMILY MEDICINE CLINIC | Facility: CLINIC | Age: 23
End: 2023-01-09

## 2023-01-09 NOTE — TELEPHONE ENCOUNTER
Refills were sent per pt request, as previously was seen by outside Psych provider who was retiring. H/o ADHD per Dr. Cristo Patricia 2019, initially Dx 2012. If still looking for records, can forward to med records. Also encourage them to ask pt for prior Psych contact info.

## 2023-01-09 NOTE — TELEPHONE ENCOUNTER
Per Rachael Marcum RN of Dr Chiquita Mendez () , patient is transitioning  to their practice and requesting for the adderall prescription . The patient is new to their practice and they do not have medication informations. They would like few office visit notes with the reason why she is on adderall. Instructed to call Medical records, but states that they only need few office visit notes,Sridevi fax number provided [phone], she will fax the request form to the office. OFFICE STAFF=Please fax the completed form and office visit notes at [phone].

## 2023-01-09 NOTE — TELEPHONE ENCOUNTER
Pt hasn't been seen by you from what I see for the dates they're asking for. Should I just send to med records?

## 2023-01-10 NOTE — TELEPHONE ENCOUNTER
Dr. Celeste Mitchell office calling to request office visit from 08/11/2021, today if possible.        Fax: [phone]  [address]: [phone]

## 2023-01-11 ENCOUNTER — MED REC SCAN ONLY (OUTPATIENT)
Dept: FAMILY MEDICINE CLINIC | Facility: CLINIC | Age: 23
End: 2023-01-11

## 2023-01-11 NOTE — TELEPHONE ENCOUNTER
Faxed note from 8-11-21 as requested to number below. Per dr Dylon Jasmine note below that is the last and only time pt was seen.  If dr Micheal Hamilton wants further info, their office can send a request to medical records for further records

## 2023-06-30 ENCOUNTER — LAB ENCOUNTER (OUTPATIENT)
Dept: LAB | Facility: HOSPITAL | Age: 23
End: 2023-06-30
Attending: ADVANCED PRACTICE MIDWIFE
Payer: COMMERCIAL

## 2023-06-30 ENCOUNTER — OFFICE VISIT (OUTPATIENT)
Dept: OBGYN CLINIC | Facility: CLINIC | Age: 23
End: 2023-06-30

## 2023-06-30 VITALS
HEART RATE: 66 BPM | WEIGHT: 148 LBS | DIASTOLIC BLOOD PRESSURE: 73 MMHG | SYSTOLIC BLOOD PRESSURE: 108 MMHG | BODY MASS INDEX: 21.19 KG/M2 | HEIGHT: 70 IN

## 2023-06-30 DIAGNOSIS — N91.2 AMENORRHEA: ICD-10-CM

## 2023-06-30 DIAGNOSIS — Z01.419 ENCOUNTER FOR GYNECOLOGICAL EXAMINATION WITHOUT ABNORMAL FINDING: ICD-10-CM

## 2023-06-30 DIAGNOSIS — Z32.00 PREGNANCY EXAMINATION OR TEST, PREGNANCY UNCONFIRMED: ICD-10-CM

## 2023-06-30 DIAGNOSIS — Z11.3 SCREEN FOR STD (SEXUALLY TRANSMITTED DISEASE): ICD-10-CM

## 2023-06-30 PROBLEM — N91.5 OLIGOMENORRHEA: Status: ACTIVE | Noted: 2023-06-30

## 2023-06-30 LAB
CONTROL LINE PRESENT WITH A CLEAR BACKGROUND (YES/NO): YES YES/NO
ESTRADIOL SERPL-MCNC: 82.3 PG/ML
FSH SERPL-ACNC: 5.3 MIU/ML
PREGNANCY TEST, URINE: NEGATIVE
PROLACTIN SERPL-MCNC: 5.2 NG/ML
TSI SER-ACNC: 2.15 MIU/ML (ref 0.36–3.74)

## 2023-06-30 PROCEDURE — 83001 ASSAY OF GONADOTROPIN (FSH): CPT

## 2023-06-30 PROCEDURE — 90651 9VHPV VACCINE 2/3 DOSE IM: CPT | Performed by: ADVANCED PRACTICE MIDWIFE

## 2023-06-30 PROCEDURE — 84443 ASSAY THYROID STIM HORMONE: CPT

## 2023-06-30 PROCEDURE — 81025 URINE PREGNANCY TEST: CPT | Performed by: ADVANCED PRACTICE MIDWIFE

## 2023-06-30 PROCEDURE — 36415 COLL VENOUS BLD VENIPUNCTURE: CPT

## 2023-06-30 PROCEDURE — 3078F DIAST BP <80 MM HG: CPT | Performed by: ADVANCED PRACTICE MIDWIFE

## 2023-06-30 PROCEDURE — 84146 ASSAY OF PROLACTIN: CPT

## 2023-06-30 PROCEDURE — 90471 IMMUNIZATION ADMIN: CPT | Performed by: ADVANCED PRACTICE MIDWIFE

## 2023-06-30 PROCEDURE — 99395 PREV VISIT EST AGE 18-39: CPT | Performed by: ADVANCED PRACTICE MIDWIFE

## 2023-06-30 PROCEDURE — 84410 TESTOSTERONE BIOAVAILABLE: CPT

## 2023-06-30 PROCEDURE — 82670 ASSAY OF TOTAL ESTRADIOL: CPT

## 2023-06-30 PROCEDURE — 3008F BODY MASS INDEX DOCD: CPT | Performed by: ADVANCED PRACTICE MIDWIFE

## 2023-06-30 PROCEDURE — 3074F SYST BP LT 130 MM HG: CPT | Performed by: ADVANCED PRACTICE MIDWIFE

## 2023-06-30 RX ORDER — DESOGESTREL AND ETHINYL ESTRADIOL 21-5 (28)
1 KIT ORAL DAILY
Qty: 28 TABLET | Refills: 12 | Status: SHIPPED | OUTPATIENT
Start: 2023-06-30 | End: 2024-06-29

## 2023-07-03 LAB
C TRACH DNA SPEC QL NAA+PROBE: NEGATIVE
N GONORRHOEA DNA SPEC QL NAA+PROBE: NEGATIVE

## 2023-07-06 LAB
SEX HORM BIND GLOB: 72.3 NMOL/L
TESTOST % FREE+WEAK BND: 9.8 %
TESTOST FREE+WEAK BND: 5.8 NG/DL
TESTOSTERONE TOT /MS: 58.7 NG/DL

## 2023-07-07 ENCOUNTER — TELEPHONE (OUTPATIENT)
Dept: OBGYN CLINIC | Facility: CLINIC | Age: 23
End: 2023-07-07

## 2023-07-07 DIAGNOSIS — R79.89 ELEVATED TESTOSTERONE LEVEL IN FEMALE: Primary | ICD-10-CM

## 2023-07-07 NOTE — TELEPHONE ENCOUNTER
PC to patient to review results. No answer left message. RN's if she calls back- her testosterone level was high and I would like her to go for a follow up lab test for 17 hydroxyprogesterone. She should do an early morning 8 am draw.  Thanks MJ

## 2023-07-27 ENCOUNTER — TELEPHONE (OUTPATIENT)
Dept: OBGYN CLINIC | Facility: CLINIC | Age: 23
End: 2023-07-27

## 2023-07-31 RX ORDER — DESOGESTREL AND ETHINYL ESTRADIOL 21-5 (28)
1 KIT ORAL DAILY
Qty: 84 TABLET | Refills: 5 | Status: SHIPPED | OUTPATIENT
Start: 2023-07-31 | End: 2024-07-30

## 2023-07-31 NOTE — TELEPHONE ENCOUNTER
[address]    Medication: Kariva 28 Day Tablet  Qty: 84.0  Directions: Take 1 tablet by mouth every day.
Informed pt that 1 year of her bc was ordered for her at her June 30th appointment. Pt voices understanding.
KARIVA 28 DAY TABLET
LMTCB
Rx for 90 day supply sent to pharmacy.
Never

## (undated) NOTE — LETTER
VACCINE ADMINISTRATION RECORD  PARENT / GUARDIAN APPROVAL  Date: 2019  Vaccine administered to: Velvet Vences     : 2000    MRN: [id number]    A copy of the appropriate Centers for Disease Control and Prevention Vaccine Information statement

## (undated) NOTE — MR AVS SNAPSHOT
[facility]  [address]  [phone]               Thank you for choosing us for your health care visit with Gale Bagley DO.   We are glad to serve you and happy to provide you with this summa · Over-the-counter decongestants may be used unless a similar medicine was prescribed. Nasal sprays work the fastest. Use one that contains phenylephrine or oxymetazoline. First blow the nose gently. Then use the spray.  Do not use these medicines [place] [address]. All rights reserved. This information is not intended as a substitute for professional medical care. Always follow your healthcare professional's instructions.         Tylenol/Acetaminophen Dosing    Please dose every 4 hours as needed,do not give mo your doctor    Infant Concentrated drops = 50 mg/1.25ml  Children's suspension =100 mg/5 ml  Children's chewable = 100mg  Ibuprofen tablets =200mg                                 Infant [address]        Adult table [address], [phone], [address] [facility], [facility] [address]     Phone:  [phone]    - Amoxicillin-Pot Clavulanate 875-125 MG Tabs            MyChart     Sign up for MyChart ac
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
## (undated) NOTE — LETTER
State of [facility] of Child Health Examination       Student's Name  Annamary Profit Birth Da Title                           Date     Signature HEALTH HISTORY          TO BE COMPLETED AND SIGNED BY PARENT/GUARDIAN AND VERIFIED BY HEALTH CARE PROVIDER    ALLERGIES  (Food, drug, insect, other) MEDICATION  (List all prescribed or taken on a regular basis.)     Diagnosis of asthma?   Child wakes during DIABETES SCREENING  BMI>85% age/sex  No And any two of the following:  Family History No   Ethnic Minority  No          Signs of Insulin Resistance (hypertension, dyslipidemia, polycystic ovarian syndrome, acanthosis nigricans)    No           At Risk  No Quick-relief  medication (e.g. Short Acting Beta Antagonist): No          Controller medication (e.g. inhaled corticosteroid):   No Other   NEEDS/MODIFICATIONS required in the school setting  None DIETARY Needs/Restrictions     None   SPECIAL INSTR

## (undated) NOTE — MR AVS SNAPSHOT
[facility] [address] [address] [address] [facility]  [phone]               Thank you for choosing us for your health care visit with Max Christopher.  Rossy Harris MD.  We are glad to serve you and happy to provide you 12-17 lbs               2.5 ml  18-23 lbs               3.75 ml  24-35 lbs               5 ml                          2                              1  36-47 lbs               7.5 ml                       3                              1&1/2  48-59 lbs 3               1&1/2 tablets  96 lbs and over                                           4 tsp                              4               2 tablets             Follow Up with Our Office     Return if symptoms worsen or fail to impro

## (undated) NOTE — ED AVS SNAPSHOT
Harpreet Grey   MRN: [id number]    Department:  [facility] Emergency Department   Date of Visit:  7/19/2018           Disclosure     Insurance plans vary and the physician(s) referred by the ER may not be covered by your plan.  Please contact CARE PHYSICIAN AT ONCE OR RETURN IMMEDIATELY TO THE EMERGENCY DEPARTMENT. If you have been prescribed any medication(s), please fill your prescription right away and begin taking the medication(s) as directed.   If you believe that any of the medications

## (undated) NOTE — LETTER
State of [facility] Examination       Student's Name  Diamond Benedict Birth D Signature                                                                                                                                   Title                           Date  07/11/2019   Signature Female School   Grade Level/ID#  College   HEALTH HISTORY          TO BE COMPLETED AND SIGNED BY PARENT/GUARDIAN AND VERIFIED BY HEALTH CARE PROVIDER    ALLERGIES  (Food, drug, insect, other)  Patient has no known allergies.  MEDICATION  (List all prescribe /65   Pulse 58   Ht 5' 9\" (1.753 m)   Wt 61.1 kg (134 lb 12.8 oz)   BMI 19.91 kg/m²     DIABETES SCREENING  BMI>85% age/sex  No And any two of the following:  Family History No    Ethnic Minority  No          Signs of Insulin Resistance (hypertensio Currently Prescribed Asthma Medication:            Quick-relief  medication (e.g. Short Acting Beta Antagonist): No          Controller medication (e.g. inhaled corticosteroid):   No Other   NEEDS/MODIFICATIONS required in the school setting  None DIET

## (undated) NOTE — MR AVS SNAPSHOT
[address], [address] [address] [address] [facility]  [phone]               Thank you for choosing us for your health care visit with Troy Araujo MD.  We are glad to serve you and happy to provide yo ulcer or GI bleeding, talk with your doctor before using these medicines. No one who is younger than 25 and ill with a fever should take aspirin. It may cause severe disease or death. · Your appetite may be poor, so a light diet is fine.  Avoid dehydration © 7175-4704 [address], [address]. All rights reserved. This information is not intended as a substitute for professional medical care. Always follow your healthcare professional's instructions.              Al o 2 or less hours of screen time a day  o 1 or more hours of physical activity a day    To help children live healthy active lives, parents can:  o Be role models themselves by making healthy eating and daily physical activity the norm for their family.   o

## (undated) NOTE — LETTER
VACCINE ADMINISTRATION RECORD  PARENT / GUARDIAN APPROVAL  Date: 2019  Vaccine administered to: Kayden Frazier     : 2000    MRN: [id number]    A copy of the appropriate Centers for Disease Control and Prevention Vaccine Information statement

## (undated) NOTE — LETTER
[facility] [place] [address] [place] [address]  [phone]     Patient: Jeremiah Puente   YOB: 2000   Date of Visit: 12/9/2020     Dear Zechariah Whelan,      December 9, 2020    At [facility], we are taking spec Note that recommendations for discontinuing isolation in persons known to be infected with SARS-CoV-2 could, in some circumstances, appear to conflict with recommendations on when to discontinue quarantine for persons known to have been exposed to SARS-CoV

## (undated) NOTE — MR AVS SNAPSHOT
[facility]  [address]  [phone]               Thank you for choosing us for your health care visit with Miri Peralta MD.  We are glad to serve you and happy to provide you with this summar Armin Lot # [id number] Jayesh Espinosa Expiration Date [id number] Date                  MyChart     Sign up for [facility] access for your child.   [facility] access allows you to view health information for your child from their recent   visit, view other healt

## (undated) NOTE — Clinical Note
2/13/2017              [address] [facility] [address]         Please excuse Janey's absence on 2/13 due to illness. Please excuse her from gym for the rest of this week. Thank you.      Sincerely,    Joe Ybarra

## (undated) NOTE — LETTER
State of [facility] Examination       Student's Name  Ryan Veronica D Signature                                                                                                                  Title   DO                       Date    7/11/2019   Signature Grade Level/ID#  College   HEALTH HISTORY          TO BE COMPLETED AND SIGNED BY PARENT/GUARDIAN AND VERIFIED BY HEALTH CARE PROVIDER    ALLERGIES  (Food, drug, insect, other)  Patient has no known allergies.  MEDICATION  (List all prescribed or taken on a /65   Pulse 58   Ht 5' 9\" (1.753 m)   Wt 61.1 kg (134 lb 12.8 oz)   BMI 19.91 kg/m²     DIABETES SCREENING  BMI>85% age/sex  No And any two of the following:  Family History No    Ethnic Minority  No          Signs of Insulin Resistance (hypertensi Currently Prescribed Asthma Medication:            Quick-relief  medication (e.g. Short Acting Beta Antagonist): No          Controller medication (e.g. inhaled corticosteroid):   No Other   NEEDS/MODIFICATIONS required in the school setting  None DIET